# Patient Record
Sex: FEMALE
[De-identification: names, ages, dates, MRNs, and addresses within clinical notes are randomized per-mention and may not be internally consistent; named-entity substitution may affect disease eponyms.]

---

## 2020-09-09 ENCOUNTER — APPOINTMENT (OUTPATIENT)
Dept: RHEUMATOLOGY | Facility: CLINIC | Age: 60
End: 2020-09-09
Payer: MEDICARE

## 2020-09-09 VITALS
SYSTOLIC BLOOD PRESSURE: 133 MMHG | HEART RATE: 79 BPM | HEIGHT: 63 IN | BODY MASS INDEX: 28 KG/M2 | DIASTOLIC BLOOD PRESSURE: 86 MMHG | WEIGHT: 158 LBS | TEMPERATURE: 96.6 F | OXYGEN SATURATION: 96 %

## 2020-09-09 DIAGNOSIS — Z79.52 LONG TERM (CURRENT) USE OF SYSTEMIC STEROIDS: ICD-10-CM

## 2020-09-09 PROCEDURE — 99205 OFFICE O/P NEW HI 60 MIN: CPT

## 2020-09-09 RX ORDER — ATORVASTATIN CALCIUM 20 MG/1
20 TABLET, FILM COATED ORAL
Refills: 0 | Status: ACTIVE | COMMUNITY

## 2020-09-09 RX ORDER — LEVOTHYROXINE SODIUM 75 UG/1
75 TABLET ORAL
Refills: 0 | Status: ACTIVE | COMMUNITY

## 2020-09-09 RX ORDER — AMLODIPINE BESYLATE 5 MG/1
5 TABLET ORAL
Refills: 0 | Status: ACTIVE | COMMUNITY

## 2020-09-09 RX ORDER — OMEPRAZOLE 20 MG/1
20 CAPSULE, DELAYED RELEASE ORAL
Refills: 0 | Status: ACTIVE | COMMUNITY

## 2020-09-10 PROBLEM — Z79.52 CURRENT CHRONIC USE OF SYSTEMIC STEROIDS: Status: ACTIVE | Noted: 2020-09-10

## 2020-09-10 NOTE — ASSESSMENT
[FreeTextEntry1] : 59-year-old Tori and female with a history of hypothyroidism, hypertension and hypercholesterolemia presents as an urgent new patient today. She presents at the request of ophthalmology. She has a recent diagnosis of possible temporal arteritis.\par \par She states that she was in her usual state of health until several months ago when she started to deal with worsening joint pains but for the last 2 weeks she has been dealing with right-sided temporal pain as well as scalp sensitivity. She went to the emergency room at [facility] about a week ago, I reviewed these records, her sedimentation rate was 63, she is now status post bilateral temporal artery biopsy which has been read as negative.\par \par On my exam today she has intact temporal artery pulses, she has full range of motion of her joints without any evidence of inflammatory arthritis.\par \par Clinically she is better.\par \par Despite having a negative temporal artery biopsy certainly temporal arthritis is still in the differential diagnosis. Although it is unusual to see this condition in [place] it is certainly possible. Clinically she did have scalp sensitivity as well as right-sided temporal pain which could be consistent with GCA.\par \par Plan-\par -She has lowered her prednisone to 20 b.i.d.\par -Continue at this dose and repeat inflammatory markers\par -For now depending on how she does clinically I will try a rapid steroid taper\par -If her symptoms return will consider use of actemra as steroid sparing medication\par -Stressed importance of not discontinuing the medication on her own\par Risks and benefits of steroids were d/w patient including but not limited to weight gain, diabetes, HTN, avascular necrosis, osteoporosis, glaucoma, cataract, infections and immunosuppression.\par If symptoms return or become worse or she develops visual symptoms she is aware to call or go to the emergency room\par -followup 2-3 weeks

## 2020-09-10 NOTE — PHYSICAL EXAM
[General Appearance - Alert] : alert [General Appearance - Well Nourished] : well nourished [Sclera] : the sclera and conjunctiva were normal [General Appearance - Well Developed] : well developed [Oropharynx] : the oropharynx was normal [Neck Appearance] : the appearance of the neck was normal [Respiration, Rhythm And Depth] : normal respiratory rhythm and effort [Auscultation Breath Sounds / Voice Sounds] : lungs were clear to auscultation bilaterally [Heart Sounds] : normal S1 and S2 [Full Pulse] : the pedal pulses are present [Edema] : there was no peripheral edema [Abdomen Tenderness] : non-tender [Supraclavicular Lymph Nodes Enlarged Bilaterally] : supraclavicular [No Spinal Tenderness] : no spinal tenderness [Cervical Lymph Nodes Enlarged Anterior Bilaterally] : anterior cervical [Nail Clubbing] : no clubbing  or cyanosis of the fingernails [Abnormal Walk] : normal gait [Motor Tone] : muscle strength and tone were normal [Musculoskeletal - Swelling] : no joint swelling seen [] : no rash [FreeTextEntry1] : FROM neck, shoulders, elbows, wrists, hands, hips, knees, ankles and feet, including the small joints of the hands and feet without any evidence of inflammatory arthritis tightness hamstrings [Motor Exam] : the motor exam was normal [Deep Tendon Reflexes (DTR)] : deep tendon reflexes were 2+ and symmetric [Affect] : the affect was normal [Impaired Insight] : insight and judgment were intact [Oriented To Time, Place, And Person] : oriented to person, place, and time

## 2020-09-10 NOTE — CONSULT LETTER
[Dear  ___] : Dear ~LORI, [Consult Letter:] : I had the pleasure of evaluating your patient, [unfilled]. [Please see my note below.] : Please see my note below. [Consult Closing:] : Thank you very much for allowing me to participate in the care of this patient.  If you have any questions, please do not hesitate to contact me. [Sincerely,] : Sincerely, [FreeTextEntry3] : Nico Marr D.O\par

## 2020-09-10 NOTE — HISTORY OF PRESENT ILLNESS
[FreeTextEntry1] : This is a Urgent new patient. She comes in at the request of ophthalmology. She comes in to evaluate for possible giant cell arteritis.\par \par She states that for the last several months she has been dealing with pain on the right side of her body especially the right hip and right shoulder. For the last couple weeks when she lies on her right side her right temple area hurts. About a week ago her symptoms became severe, she took her blood pressure which was very high, she went to [facility]. She went to [facility]. She was seen by neurology there, her sedimentation rate was 63. She had a temporal artery biopsy which was read as negative. She's currently using prednisone 60 mg daily. On her own she lowered it to 40 mg today. She went to see ophthalmology today, her eye exam was normal.\par \par She does state that for the last few weeks when ever she brushed it here she had scalp sensitivity. She denies visual symptoms. She did have some jaw pain. She admits to a dry cough on occasion. She denies night sweats. She denies prolonged morning stiffness the stiffness in her shoulders or hips.\par \par She has an average appetite and denies weight loss. She denies fevers or chills or night sweats.

## 2020-09-21 ENCOUNTER — APPOINTMENT (OUTPATIENT)
Dept: RHEUMATOLOGY | Facility: CLINIC | Age: 60
End: 2020-09-21
Payer: MEDICAID

## 2020-09-21 VITALS
WEIGHT: 158 LBS | HEIGHT: 63 IN | HEART RATE: 91 BPM | BODY MASS INDEX: 28 KG/M2 | SYSTOLIC BLOOD PRESSURE: 120 MMHG | DIASTOLIC BLOOD PRESSURE: 79 MMHG | OXYGEN SATURATION: 99 % | TEMPERATURE: 97.8 F

## 2020-09-21 DIAGNOSIS — M31.6 OTHER GIANT CELL ARTERITIS: ICD-10-CM

## 2020-09-21 DIAGNOSIS — R70.0 ELEVATED ERYTHROCYTE SEDIMENTATION RATE: ICD-10-CM

## 2020-09-21 PROCEDURE — 99213 OFFICE O/P EST LOW 20 MIN: CPT

## 2020-09-21 RX ORDER — ALPRAZOLAM 0.25 MG/1
0.25 TABLET ORAL
Qty: 20 | Refills: 0 | Status: ACTIVE | COMMUNITY
Start: 2020-09-21 | End: 1900-01-01

## 2020-09-21 RX ORDER — PREDNISONE 20 MG/1
20 TABLET ORAL TWICE DAILY
Qty: 60 | Refills: 1 | Status: DISCONTINUED | COMMUNITY
End: 2020-09-21

## 2020-09-21 NOTE — PHYSICAL EXAM
[General Appearance - Alert] : alert [General Appearance - Well Nourished] : well nourished [General Appearance - Well Developed] : well developed [Sclera] : the sclera and conjunctiva were normal [Oropharynx] : the oropharynx was normal [Neck Appearance] : the appearance of the neck was normal [Respiration, Rhythm And Depth] : normal respiratory rhythm and effort [Auscultation Breath Sounds / Voice Sounds] : lungs were clear to auscultation bilaterally [Heart Sounds] : normal S1 and S2 [Full Pulse] : the pedal pulses are present [Edema] : there was no peripheral edema [Abdomen Tenderness] : non-tender [Cervical Lymph Nodes Enlarged Anterior Bilaterally] : anterior cervical [Supraclavicular Lymph Nodes Enlarged Bilaterally] : supraclavicular [No Spinal Tenderness] : no spinal tenderness [Abnormal Walk] : normal gait [Nail Clubbing] : no clubbing  or cyanosis of the fingernails [Musculoskeletal - Swelling] : no joint swelling seen [Motor Tone] : muscle strength and tone were normal [FreeTextEntry1] : FROM neck, shoulders, elbows, wrists, hands, hips, knees, ankles and feet, including the small joints of the hands and feet without any evidence of inflammatory arthritis tightness hamstrings [] : no rash [Deep Tendon Reflexes (DTR)] : deep tendon reflexes were 2+ and symmetric [Motor Exam] : the motor exam was normal [Oriented To Time, Place, And Person] : oriented to person, place, and time [Impaired Insight] : insight and judgment were intact [Affect] : the affect was normal

## 2020-09-21 NOTE — HISTORY OF PRESENT ILLNESS
[FreeTextEntry1] : Followup visit\par I have spoken to her on several occasions since her initial visit at our office. She was admitted at [facility] again. I spoke with the hospitalist, she is now off the steroids. She was having adverse effects to the prednisone. She is also using Xanax p.r.n. which helps. Her son is with her today.\par \par She denies any headaches or visual symptoms. She denies jaw claudication. She denies prolonged morning stiffness the stiffness of pain in her shoulders or hips. She rates it symptoms at a 1/10.\par \par She had an extensive workup at the hospital, I reviewed these records his sedimentation rate was 20.

## 2020-09-21 NOTE — ASSESSMENT
[FreeTextEntry1] : 59-year-old Tori and female with a history of hypothyroidism, hypertension and hypercholesterolemia seen as an urgent new patient 0n 9/9. lamin. She presents at the request of ophthalmology. She has a recent diagnosis of possible temporal arteritis.\par \par She states that she was in her usual state of health until several months ago when she started to deal with worsening joint pains but for the last 2 weeks she has been dealing with right-sided temporal pain as well as scalp sensitivity. She went to the emergency room at [facility] about a week ago, I reviewed these records, her sedimentation rate was 63, she is now status post bilateral temporal artery biopsy which has been read as negative.\par \par On my exam today she has intact temporal artery pulses, she has full range of motion of her joints without any evidence of inflammatory arthritis.\par \par Clinically she is better.\par \par Despite having a negative temporal artery biopsy certainly temporal arthritis is still in the differential diagnosis. Although it is unusual to see this condition in [place] it is certainly possible. Clinically she did have scalp sensitivity as well as right-sided temporal pain which could be consistent with GCA.\par \par Plan-\par -She is now off steroids\par -She could not tolerate them\par -She was readmitted at [facility] and had an extensive workup, her sedimentation rate on 2 occasions now has been normal\par -We'll continue with clinical surveillance on steroids\par -She does have some underlying anxiety, I will renew the Xanax\par -to repeat the sedimentation rate in a month\par -Followup p.r.n. pending labs\par

## 2020-10-09 ENCOUNTER — APPOINTMENT (OUTPATIENT)
Dept: RHEUMATOLOGY | Facility: CLINIC | Age: 60
End: 2020-10-09

## 2022-12-22 ENCOUNTER — OFFICE (OUTPATIENT)
Dept: URBAN - METROPOLITAN AREA CLINIC 12 | Facility: CLINIC | Age: 62
Setting detail: OPHTHALMOLOGY
End: 2022-12-22
Payer: COMMERCIAL

## 2022-12-22 DIAGNOSIS — H25.13: ICD-10-CM

## 2022-12-22 DIAGNOSIS — H10.523: ICD-10-CM

## 2022-12-22 DIAGNOSIS — H04.123: ICD-10-CM

## 2022-12-22 DIAGNOSIS — H43.393: ICD-10-CM

## 2022-12-22 PROCEDURE — 92014 COMPRE OPH EXAM EST PT 1/>: CPT | Performed by: OPHTHALMOLOGY

## 2022-12-22 ASSESSMENT — REFRACTION_AUTOREFRACTION
OS_AXIS: 069
OD_SPHERE: +1.25
OD_AXIS: 106
OS_CYLINDER: -1.00
OD_CYLINDER: -0.50
OS_SPHERE: +2.25

## 2022-12-22 ASSESSMENT — VISUAL ACUITY
OD_BCVA: 20/30+3
OS_BCVA: 20/40

## 2022-12-22 ASSESSMENT — CONFRONTATIONAL VISUAL FIELD TEST (CVF)
OS_FINDINGS: FULL
OD_FINDINGS: FULL

## 2022-12-22 ASSESSMENT — SPHEQUIV_DERIVED
OS_SPHEQUIV: 1.75
OD_SPHEQUIV: 1
OD_SPHEQUIV: 1
OS_SPHEQUIV: 1.75

## 2022-12-22 ASSESSMENT — AXIALLENGTH_DERIVED
OD_AL: 23.9894
OS_AL: 23.6448
OS_AL: 23.6448
OD_AL: 23.9894

## 2022-12-22 ASSESSMENT — KERATOMETRY
OD_AXISANGLE_DEGREES: 047
OD_K2POWER_DIOPTERS: 41.50
OD_K1POWER_DIOPTERS: 41.25
OS_K1POWER_DIOPTERS: 41.25
OS_AXISANGLE_DEGREES: 114
OS_K2POWER_DIOPTERS: 41.75

## 2022-12-22 ASSESSMENT — REFRACTION_CURRENTRX
OD_ADD: +2.25
OS_ADD: +2.25
OS_VPRISM_DIRECTION: PROGS
OS_CYLINDER: -0.25
OS_OVR_VA: 20/
OD_SPHERE: +1.25
OD_AXIS: 0
OS_AXIS: 078
OS_SPHERE: +1.50
OD_CYLINDER: 0.00
OD_OVR_VA: 20/
OD_VPRISM_DIRECTION: PROGS

## 2022-12-22 ASSESSMENT — REFRACTION_MANIFEST
OD_AXIS: 105
OS_AXIS: 070
OS_VA1: 20/40
OD_CYLINDER: -0.50
OS_CYLINDER: -1.00
OD_SPHERE: +1.25
OS_SPHERE: +2.25
OD_VA1: 20/20

## 2022-12-22 ASSESSMENT — TONOMETRY
OS_IOP_MMHG: 16
OD_IOP_MMHG: 15

## 2022-12-22 ASSESSMENT — SUPERFICIAL PUNCTATE KERATITIS (SPK)
OS_SPK: T
OD_SPK: T

## 2023-03-23 ENCOUNTER — OFFICE (OUTPATIENT)
Dept: URBAN - METROPOLITAN AREA CLINIC 12 | Facility: CLINIC | Age: 63
Setting detail: OPHTHALMOLOGY
End: 2023-03-23
Payer: COMMERCIAL

## 2023-03-23 DIAGNOSIS — H43.393: ICD-10-CM

## 2023-03-23 DIAGNOSIS — H25.13: ICD-10-CM

## 2023-03-23 DIAGNOSIS — H04.123: ICD-10-CM

## 2023-03-23 PROCEDURE — 99214 OFFICE O/P EST MOD 30 MIN: CPT | Performed by: OPHTHALMOLOGY

## 2023-03-23 ASSESSMENT — REFRACTION_CURRENTRX
OD_SPHERE: +1.25
OS_OVR_VA: 20/
OD_CYLINDER: 0.00
OS_SPHERE: +1.50
OD_OVR_VA: 20/
OS_CYLINDER: -0.25
OS_AXIS: 078
OS_VPRISM_DIRECTION: PROGS
OS_ADD: +2.25
OD_VPRISM_DIRECTION: PROGS
OD_ADD: +2.25
OD_AXIS: 0

## 2023-03-23 ASSESSMENT — REFRACTION_MANIFEST
OS_SPHERE: +2.25
OS_AXIS: 070
OS_CYLINDER: -1.00
OD_SPHERE: +1.25
OD_CYLINDER: -0.50
OD_AXIS: 105
OD_VA1: 20/20
OS_VA1: 20/40

## 2023-03-23 ASSESSMENT — CONFRONTATIONAL VISUAL FIELD TEST (CVF)
OD_FINDINGS: FULL
OS_FINDINGS: FULL

## 2023-03-23 ASSESSMENT — SPHEQUIV_DERIVED
OS_SPHEQUIV: 1.75
OD_SPHEQUIV: 1
OD_SPHEQUIV: 0.875
OS_SPHEQUIV: 1.375

## 2023-03-23 ASSESSMENT — AXIALLENGTH_DERIVED
OD_AL: 24.1356
OS_AL: 23.7925
OS_AL: 23.6448
OD_AL: 24.0847

## 2023-03-23 ASSESSMENT — REFRACTION_AUTOREFRACTION
OD_CYLINDER: -0.75
OS_SPHERE: +1.75
OD_AXIS: 085
OD_SPHERE: +1.25
OS_AXIS: 089
OS_CYLINDER: -0.75

## 2023-03-23 ASSESSMENT — VISUAL ACUITY
OS_BCVA: 20/40-1
OD_BCVA: 20/30

## 2023-03-23 ASSESSMENT — KERATOMETRY
OS_K1POWER_DIOPTERS: 41.50
OD_AXISANGLE_DEGREES: 051
OS_K2POWER_DIOPTERS: 41.50
OD_K2POWER_DIOPTERS: 41.25
OS_AXISANGLE_DEGREES: 090
OD_K1POWER_DIOPTERS: 41.00

## 2023-03-23 ASSESSMENT — SUPERFICIAL PUNCTATE KERATITIS (SPK)
OS_SPK: T
OD_SPK: T

## 2023-03-23 ASSESSMENT — TONOMETRY
OS_IOP_MMHG: 14
OD_IOP_MMHG: 16

## 2024-02-14 ENCOUNTER — OFFICE (OUTPATIENT)
Dept: URBAN - METROPOLITAN AREA CLINIC 12 | Facility: CLINIC | Age: 64
Setting detail: OPHTHALMOLOGY
End: 2024-02-14
Payer: COMMERCIAL

## 2024-02-14 DIAGNOSIS — H16.223: ICD-10-CM

## 2024-02-14 DIAGNOSIS — L30.8: ICD-10-CM

## 2024-02-14 PROBLEM — H25.13 CATARACT SENILE NUCLEAR SCLEROSIS; BOTH EYES: Status: ACTIVE | Noted: 2024-02-14

## 2024-02-14 PROCEDURE — 99213 OFFICE O/P EST LOW 20 MIN: CPT | Performed by: STUDENT IN AN ORGANIZED HEALTH CARE EDUCATION/TRAINING PROGRAM

## 2024-02-14 ASSESSMENT — REFRACTION_MANIFEST
OD_VA1: 20/20
OS_CYLINDER: -1.00
OS_VA1: 20/40
OD_AXIS: 105
OS_SPHERE: +2.25
OD_CYLINDER: -0.50
OS_AXIS: 070
OD_SPHERE: +1.25

## 2024-02-14 ASSESSMENT — SPHEQUIV_DERIVED
OD_SPHEQUIV: 0.75
OS_SPHEQUIV: 1.75
OS_SPHEQUIV: 1.125
OD_SPHEQUIV: 1

## 2024-02-14 ASSESSMENT — REFRACTION_AUTOREFRACTION
OD_AXIS: 097
OS_CYLINDER: -0.25
OS_SPHERE: +1.25
OD_SPHERE: +1.00
OS_AXIS: 159
OD_CYLINDER: -0.50

## 2024-02-14 ASSESSMENT — REFRACTION_CURRENTRX
OS_ADD: +2.25
OS_CYLINDER: -0.25
OS_OVR_VA: 20/
OD_CYLINDER: 0.00
OS_SPHERE: +1.50
OD_VPRISM_DIRECTION: PROGS
OD_SPHERE: +1.25
OD_ADD: +2.25
OS_VPRISM_DIRECTION: PROGS
OS_AXIS: 078
OD_AXIS: 0
OD_OVR_VA: 20/

## 2024-02-14 ASSESSMENT — CONFRONTATIONAL VISUAL FIELD TEST (CVF)
OD_FINDINGS: FULL
OS_FINDINGS: FULL

## 2024-02-14 ASSESSMENT — SUPERFICIAL PUNCTATE KERATITIS (SPK)
OD_SPK: 2+ 3+
OS_SPK: 2+ 3+

## 2025-01-24 ENCOUNTER — OFFICE (OUTPATIENT)
Dept: URBAN - METROPOLITAN AREA CLINIC 12 | Facility: CLINIC | Age: 65
Setting detail: OPHTHALMOLOGY
End: 2025-01-24
Payer: COMMERCIAL

## 2025-01-24 DIAGNOSIS — H25.13: ICD-10-CM

## 2025-01-24 DIAGNOSIS — H16.223: ICD-10-CM

## 2025-01-24 DIAGNOSIS — H43.393: ICD-10-CM

## 2025-01-24 PROBLEM — H25.12 CATARACT; RIGHT EYE, LEFT EYE, BOTH EYES: Status: ACTIVE | Noted: 2025-01-24

## 2025-01-24 PROBLEM — H25.11 CATARACT; RIGHT EYE, LEFT EYE, BOTH EYES: Status: ACTIVE | Noted: 2025-01-24

## 2025-01-24 PROCEDURE — 99214 OFFICE O/P EST MOD 30 MIN: CPT | Performed by: OPHTHALMOLOGY

## 2025-01-24 ASSESSMENT — KERATOMETRY
OS_AXISANGLE_DEGREES: 143
OD_K2POWER_DIOPTERS: 41.25
OD_AXISANGLE_DEGREES: 043
OS_K1POWER_DIOPTERS: 41.00
OD_K1POWER_DIOPTERS: 40.75
OS_K2POWER_DIOPTERS: 41.50

## 2025-01-24 ASSESSMENT — VISUAL ACUITY
OD_BCVA: 20/50-2
OS_BCVA: 20/80

## 2025-01-24 ASSESSMENT — SUPERFICIAL PUNCTATE KERATITIS (SPK)
OS_SPK: 2+ 3+
OD_SPK: 2+ 3+

## 2025-01-24 ASSESSMENT — REFRACTION_CURRENTRX
OD_OVR_VA: 20/
OS_VPRISM_DIRECTION: PROGS
OD_CYLINDER: SPH
OD_SPHERE: +1.25
OS_AXIS: 000
OS_ADD: +2.25
OS_SPHERE: +1.25
OD_VPRISM_DIRECTION: PROGS
OS_OVR_VA: 20/
OD_ADD: +2.25
OD_AXIS: 0
OS_CYLINDER: SPH

## 2025-01-24 ASSESSMENT — REFRACTION_AUTOREFRACTION
OS_SPHERE: +2.50
OD_AXIS: 089
OD_CYLINDER: -1.25
OS_AXIS: 092
OD_SPHERE: +1.00
OS_CYLINDER: -1.75

## 2025-01-24 ASSESSMENT — REFRACTION_MANIFEST
OD_SPHERE: +1.00
OS_CYLINDER: -1.75
OS_VA1: 20/NI
OS_AXIS: 090
OS_SPHERE: +2.50
OD_AXIS: 090
OD_VA1: 20/50
OD_CYLINDER: -1.25

## 2025-01-24 ASSESSMENT — TONOMETRY
OD_IOP_MMHG: 12
OS_IOP_MMHG: 13

## 2025-01-24 ASSESSMENT — CONFRONTATIONAL VISUAL FIELD TEST (CVF)
OS_FINDINGS: FULL
OD_FINDINGS: FULL

## 2025-02-11 ENCOUNTER — OFFICE (OUTPATIENT)
Dept: URBAN - METROPOLITAN AREA CLINIC 12 | Facility: CLINIC | Age: 65
Setting detail: OPHTHALMOLOGY
End: 2025-02-11
Payer: COMMERCIAL

## 2025-02-11 DIAGNOSIS — H25.11: ICD-10-CM

## 2025-02-11 DIAGNOSIS — H25.13: ICD-10-CM

## 2025-02-11 PROCEDURE — 99213 OFFICE O/P EST LOW 20 MIN: CPT | Performed by: OPHTHALMOLOGY

## 2025-02-11 PROCEDURE — 92136 OPHTHALMIC BIOMETRY: CPT | Mod: TC | Performed by: OPHTHALMOLOGY

## 2025-02-11 PROCEDURE — 92136 OPHTHALMIC BIOMETRY: CPT | Mod: RT | Performed by: OPHTHALMOLOGY

## 2025-02-11 ASSESSMENT — REFRACTION_AUTOREFRACTION
OS_AXIS: 092
OD_SPHERE: +1.00
OD_CYLINDER: -1.25
OS_SPHERE: +2.50
OD_AXIS: 089
OS_CYLINDER: -1.75

## 2025-02-11 ASSESSMENT — REFRACTION_MANIFEST
OS_VA1: 20/NI
OS_AXIS: 090
OD_AXIS: 090
OD_CYLINDER: -1.25
OD_VA1: 20/50
OS_SPHERE: +2.50
OS_CYLINDER: -1.75
OD_SPHERE: +1.00

## 2025-02-11 ASSESSMENT — KERATOMETRY
OS_AXISANGLE_DEGREES: 143
OD_AXISANGLE_DEGREES: 043
OS_K1POWER_DIOPTERS: 41.00
OD_K1POWER_DIOPTERS: 40.75
OS_K2POWER_DIOPTERS: 41.50
OD_K2POWER_DIOPTERS: 41.25

## 2025-02-11 ASSESSMENT — REFRACTION_CURRENTRX
OD_VPRISM_DIRECTION: PROGS
OS_AXIS: 000
OS_OVR_VA: 20/
OS_ADD: +2.25
OD_CYLINDER: SPH
OD_ADD: +2.25
OS_SPHERE: +1.25
OD_SPHERE: +1.25
OD_AXIS: 0
OD_OVR_VA: 20/
OS_VPRISM_DIRECTION: PROGS
OS_CYLINDER: SPH

## 2025-02-11 ASSESSMENT — TONOMETRY
OD_IOP_MMHG: 13
OS_IOP_MMHG: 12

## 2025-02-11 ASSESSMENT — CONFRONTATIONAL VISUAL FIELD TEST (CVF)
OD_FINDINGS: FULL
OS_FINDINGS: FULL

## 2025-02-11 ASSESSMENT — VISUAL ACUITY
OD_BCVA: 20/40
OS_BCVA: 20/70

## 2025-02-11 ASSESSMENT — SUPERFICIAL PUNCTATE KERATITIS (SPK)
OS_SPK: 1+ 2+
OD_SPK: 1+ 2+

## 2025-02-24 ENCOUNTER — ASC (OUTPATIENT)
Dept: URBAN - METROPOLITAN AREA SURGERY 8 | Facility: SURGERY | Age: 65
Setting detail: OPHTHALMOLOGY
End: 2025-02-24
Payer: COMMERCIAL

## 2025-02-24 DIAGNOSIS — H52.221: ICD-10-CM

## 2025-02-24 DIAGNOSIS — H25.11: ICD-10-CM

## 2025-02-24 PROCEDURE — 66984 XCAPSL CTRC RMVL W/O ECP: CPT | Mod: RT | Performed by: OPHTHALMOLOGY

## 2025-02-24 PROCEDURE — FEMTO PRECISION LASER CATARACT SURGERY: Mod: GY | Performed by: OPHTHALMOLOGY

## 2025-02-25 ENCOUNTER — OFFICE (OUTPATIENT)
Dept: URBAN - METROPOLITAN AREA CLINIC 12 | Facility: CLINIC | Age: 65
Setting detail: OPHTHALMOLOGY
End: 2025-02-25
Payer: COMMERCIAL

## 2025-02-25 ENCOUNTER — RX ONLY (RX ONLY)
Age: 65
End: 2025-02-25

## 2025-02-25 DIAGNOSIS — Z96.1: ICD-10-CM

## 2025-02-25 PROCEDURE — 99024 POSTOP FOLLOW-UP VISIT: CPT | Performed by: OPTOMETRIST

## 2025-02-25 ASSESSMENT — REFRACTION_CURRENTRX
OD_ADD: +2.25
OS_OVR_VA: 20/
OD_CYLINDER: SPH
OS_AXIS: 000
OD_SPHERE: +1.25
OD_OVR_VA: 20/
OS_ADD: +2.25
OS_VPRISM_DIRECTION: PROGS
OD_VPRISM_DIRECTION: PROGS
OS_SPHERE: +1.25
OS_CYLINDER: SPH
OD_AXIS: 0

## 2025-02-25 ASSESSMENT — TONOMETRY
OD_IOP_MMHG: 14
OS_IOP_MMHG: 15

## 2025-02-25 ASSESSMENT — REFRACTION_AUTOREFRACTION
OS_CYLINDER: -1.00
OS_SPHERE: +1.75
OS_AXIS: 110
OD_AXIS: 002
OD_SPHERE: +0.25
OD_CYLINDER: -0.25

## 2025-02-25 ASSESSMENT — REFRACTION_MANIFEST
OS_CYLINDER: -1.75
OD_VA1: 20/50
OD_CYLINDER: -1.25
OS_SPHERE: +2.50
OS_AXIS: 090
OD_SPHERE: +1.00
OS_VA1: 20/NI
OD_AXIS: 090

## 2025-02-25 ASSESSMENT — VISUAL ACUITY
OD_BCVA: 20/50
OS_BCVA: 20/20

## 2025-02-25 ASSESSMENT — KERATOMETRY
OS_AXISANGLE_DEGREES: 125
OD_K2POWER_DIOPTERS: 41.25
OS_K1POWER_DIOPTERS: 41.25
OS_K2POWER_DIOPTERS: 41.50
OD_K1POWER_DIOPTERS: 40.50
OD_AXISANGLE_DEGREES: 097

## 2025-02-25 ASSESSMENT — CONFRONTATIONAL VISUAL FIELD TEST (CVF)
OS_FINDINGS: FULL
OD_FINDINGS: FULL

## 2025-02-25 ASSESSMENT — SUPERFICIAL PUNCTATE KERATITIS (SPK)
OD_SPK: 1+ 2+
OS_SPK: 1+ 2+

## 2025-03-04 ENCOUNTER — OFFICE (OUTPATIENT)
Dept: URBAN - METROPOLITAN AREA CLINIC 12 | Facility: CLINIC | Age: 65
Setting detail: OPHTHALMOLOGY
End: 2025-03-04
Payer: COMMERCIAL

## 2025-03-04 DIAGNOSIS — H25.12: ICD-10-CM

## 2025-03-04 PROCEDURE — 92136 OPHTHALMIC BIOMETRY: CPT | Mod: LT | Performed by: OPHTHALMOLOGY

## 2025-03-04 ASSESSMENT — REFRACTION_CURRENTRX
OS_AXIS: 000
OD_VPRISM_DIRECTION: PROGS
OD_AXIS: 0
OD_SPHERE: +1.25
OD_ADD: +2.25
OS_CYLINDER: SPH
OS_SPHERE: +1.25
OD_OVR_VA: 20/
OS_VPRISM_DIRECTION: PROGS
OS_OVR_VA: 20/
OS_ADD: +2.25
OD_CYLINDER: SPH

## 2025-03-04 ASSESSMENT — REFRACTION_MANIFEST
OD_AXIS: 090
OD_SPHERE: +1.00
OD_CYLINDER: -1.25
OS_SPHERE: +2.50
OS_CYLINDER: -1.75
OS_AXIS: 090
OS_VA1: 20/NI
OD_VA1: 20/50

## 2025-03-04 ASSESSMENT — CONFRONTATIONAL VISUAL FIELD TEST (CVF)
OD_FINDINGS: FULL
OS_FINDINGS: FULL

## 2025-03-04 ASSESSMENT — REFRACTION_AUTOREFRACTION
OS_SPHERE: +2.00
OD_AXIS: 096
OS_AXIS: 100
OD_CYLINDER: -0.50
OS_CYLINDER: -1.50
OD_SPHERE: +0.25

## 2025-03-04 ASSESSMENT — TONOMETRY
OD_IOP_MMHG: 15
OS_IOP_MMHG: 12

## 2025-03-04 ASSESSMENT — SUPERFICIAL PUNCTATE KERATITIS (SPK)
OD_SPK: 1+ 2+
OS_SPK: 1+ 2+

## 2025-03-04 ASSESSMENT — KERATOMETRY
OD_AXISANGLE_DEGREES: 090
OD_K2POWER_DIOPTERS: 41.00
OS_AXISANGLE_DEGREES: 124
OD_K1POWER_DIOPTERS: 41.00
OS_K2POWER_DIOPTERS: 41.50
OS_K1POWER_DIOPTERS: 41.00

## 2025-03-04 ASSESSMENT — VISUAL ACUITY
OS_BCVA: 20/20-1
OD_BCVA: 20/60

## 2025-03-24 ENCOUNTER — ASC (OUTPATIENT)
Dept: URBAN - METROPOLITAN AREA SURGERY 8 | Facility: SURGERY | Age: 65
Setting detail: OPHTHALMOLOGY
End: 2025-03-24
Payer: COMMERCIAL

## 2025-03-24 DIAGNOSIS — H25.12: ICD-10-CM

## 2025-03-24 DIAGNOSIS — H52.222: ICD-10-CM

## 2025-03-24 PROCEDURE — 66984 XCAPSL CTRC RMVL W/O ECP: CPT | Mod: 79,LT | Performed by: OPHTHALMOLOGY

## 2025-03-24 PROCEDURE — FEMTO PRECISION LASER CATARACT SURGERY: Mod: GY | Performed by: OPHTHALMOLOGY

## 2025-03-25 ENCOUNTER — OFFICE (OUTPATIENT)
Dept: URBAN - METROPOLITAN AREA CLINIC 12 | Facility: CLINIC | Age: 65
Setting detail: OPHTHALMOLOGY
End: 2025-03-25
Payer: COMMERCIAL

## 2025-03-25 DIAGNOSIS — Z96.1: ICD-10-CM

## 2025-03-25 PROCEDURE — 99024 POSTOP FOLLOW-UP VISIT: CPT | Performed by: OPTOMETRIST

## 2025-03-25 ASSESSMENT — KERATOMETRY
OD_AXISANGLE_DEGREES: 81
OD_K1POWER_DIOPTERS: 41.00
OS_K1POWER_DIOPTERS: 40.75
OD_K2POWER_DIOPTERS: 41.50
OS_AXISANGLE_DEGREES: 82
OS_K2POWER_DIOPTERS: 41.25

## 2025-03-25 ASSESSMENT — REFRACTION_AUTOREFRACTION
OS_CYLINDER: -0.25
OD_SPHERE: -0.25
OD_AXIS: 0
OS_AXIS: 168
OS_SPHERE: +0.25
OD_CYLINDER: SPHERE

## 2025-03-25 ASSESSMENT — REFRACTION_MANIFEST
OS_SPHERE: +2.50
OS_CYLINDER: -1.75
OD_SPHERE: +1.00
OS_VA1: 20/NI
OD_CYLINDER: -1.25
OD_VA1: 20/50
OD_AXIS: 090
OS_AXIS: 090

## 2025-03-25 ASSESSMENT — SUPERFICIAL PUNCTATE KERATITIS (SPK)
OS_SPK: 1+ 2+
OD_SPK: 1+ 2+

## 2025-03-25 ASSESSMENT — REFRACTION_CURRENTRX
OD_SPHERE: +1.25
OD_AXIS: 0
OS_OVR_VA: 20/
OS_VPRISM_DIRECTION: PROGS
OD_OVR_VA: 20/
OD_ADD: +2.25
OS_SPHERE: +1.25
OS_CYLINDER: SPH
OD_CYLINDER: SPH
OS_AXIS: 000
OD_VPRISM_DIRECTION: PROGS
OS_ADD: +2.25

## 2025-03-25 ASSESSMENT — VISUAL ACUITY
OD_BCVA: 20/20
OS_BCVA: 20/20-1

## 2025-03-25 ASSESSMENT — CONFRONTATIONAL VISUAL FIELD TEST (CVF)
OD_FINDINGS: FULL
OS_FINDINGS: FULL

## 2025-03-25 ASSESSMENT — TONOMETRY: OD_IOP_MMHG: 13

## 2025-04-01 ENCOUNTER — OFFICE (OUTPATIENT)
Dept: URBAN - METROPOLITAN AREA CLINIC 12 | Facility: CLINIC | Age: 65
Setting detail: OPHTHALMOLOGY
End: 2025-04-01
Payer: COMMERCIAL

## 2025-04-01 DIAGNOSIS — Z96.1: ICD-10-CM

## 2025-04-01 PROCEDURE — 99024 POSTOP FOLLOW-UP VISIT: CPT | Performed by: OPTOMETRIST

## 2025-04-01 ASSESSMENT — SUPERFICIAL PUNCTATE KERATITIS (SPK)
OS_SPK: 1+ 2+
OD_SPK: 1+ 2+

## 2025-04-01 ASSESSMENT — REFRACTION_CURRENTRX
OS_ADD: +2.25
OD_VPRISM_DIRECTION: PROGS
OS_SPHERE: +1.25
OS_CYLINDER: SPH
OD_CYLINDER: SPH
OD_SPHERE: +1.25
OS_VPRISM_DIRECTION: PROGS
OD_ADD: +2.25
OS_AXIS: 000
OD_AXIS: 0
OD_OVR_VA: 20/
OS_OVR_VA: 20/

## 2025-04-01 ASSESSMENT — REFRACTION_MANIFEST
OD_SPHERE: +1.00
OD_CYLINDER: -1.25
OD_VA1: 20/50
OS_AXIS: 090
OD_AXIS: 090
OS_VA1: 20/NI
OS_CYLINDER: -1.75
OS_SPHERE: +2.50

## 2025-04-01 ASSESSMENT — REFRACTION_AUTOREFRACTION
OD_SPHERE: PLANO
OD_CYLINDER: -0.25
OD_AXIS: 090
OS_AXIS: 046
OS_CYLINDER: -0.25
OS_SPHERE: PLANO

## 2025-04-01 ASSESSMENT — KERATOMETRY
OD_AXISANGLE_DEGREES: 089
OS_AXISANGLE_DEGREES: 090
OS_K2POWER_DIOPTERS: 4.50
OD_K2POWER_DIOPTERS: 41.25
OS_K1POWER_DIOPTERS: 41.50
OD_K1POWER_DIOPTERS: 41.00

## 2025-04-01 ASSESSMENT — VISUAL ACUITY
OD_BCVA: 20/20
OS_BCVA: 20/20-1

## 2025-04-01 ASSESSMENT — TONOMETRY
OD_IOP_MMHG: 10
OS_IOP_MMHG: 14

## 2025-04-01 ASSESSMENT — CONFRONTATIONAL VISUAL FIELD TEST (CVF)
OD_FINDINGS: FULL
OS_FINDINGS: FULL

## 2025-04-15 ENCOUNTER — OFFICE (OUTPATIENT)
Dept: URBAN - METROPOLITAN AREA CLINIC 12 | Facility: CLINIC | Age: 65
Setting detail: OPHTHALMOLOGY
End: 2025-04-15
Payer: COMMERCIAL

## 2025-04-15 ENCOUNTER — RX ONLY (RX ONLY)
Age: 65
End: 2025-04-15

## 2025-04-15 DIAGNOSIS — Z96.1: ICD-10-CM

## 2025-04-15 PROCEDURE — 99024 POSTOP FOLLOW-UP VISIT: CPT | Performed by: OPHTHALMOLOGY

## 2025-04-15 ASSESSMENT — REFRACTION_CURRENTRX
OD_CYLINDER: SPH
OS_VPRISM_DIRECTION: PROGS
OD_ADD: +2.25
OD_SPHERE: +1.25
OS_OVR_VA: 20/
OS_AXIS: 000
OD_OVR_VA: 20/
OD_AXIS: 0
OS_CYLINDER: SPH
OS_ADD: +2.25
OD_VPRISM_DIRECTION: PROGS
OS_SPHERE: +1.25

## 2025-04-15 ASSESSMENT — REFRACTION_AUTOREFRACTION
OS_SPHERE: -0.25
OS_AXIS: 061
OD_CYLINDER: -0.25
OD_AXIS: 094
OD_SPHERE: PLANO
OS_CYLINDER: -0.25

## 2025-04-15 ASSESSMENT — KERATOMETRY
OS_K2POWER_DIOPTERS: 41.50
OD_AXISANGLE_DEGREES: 090
OD_K2POWER_DIOPTERS: 41.25
OS_K1POWER_DIOPTERS: 41.25
OD_K1POWER_DIOPTERS: 41.25
OS_AXISANGLE_DEGREES: 174

## 2025-04-15 ASSESSMENT — REFRACTION_MANIFEST
OD_CYLINDER: -1.25
OD_AXIS: 090
OD_VA1: 20/50
OD_SPHERE: +1.00
OS_CYLINDER: -1.75
OS_VA1: 20/NI
OS_SPHERE: +2.50
OS_AXIS: 090

## 2025-04-15 ASSESSMENT — VISUAL ACUITY
OS_BCVA: 20/25+2
OD_BCVA: 20/25+2

## 2025-04-15 ASSESSMENT — CONFRONTATIONAL VISUAL FIELD TEST (CVF)
OS_FINDINGS: FULL
OD_FINDINGS: FULL

## 2025-04-15 ASSESSMENT — TONOMETRY
OD_IOP_MMHG: 10
OS_IOP_MMHG: 16

## 2025-06-05 ENCOUNTER — OFFICE (OUTPATIENT)
Dept: URBAN - METROPOLITAN AREA CLINIC 12 | Facility: CLINIC | Age: 65
Setting detail: OPHTHALMOLOGY
End: 2025-06-05
Payer: COMMERCIAL

## 2025-06-05 DIAGNOSIS — Z96.1: ICD-10-CM

## 2025-06-05 DIAGNOSIS — H16.223: ICD-10-CM

## 2025-06-05 PROCEDURE — 99024 POSTOP FOLLOW-UP VISIT: CPT | Performed by: OPHTHALMOLOGY

## 2025-06-05 ASSESSMENT — KERATOMETRY
OS_K1POWER_DIOPTERS: 41.50
OS_K2POWER_DIOPTERS: 41.50
OD_AXISANGLE_DEGREES: 069
OD_K2POWER_DIOPTERS: 41.25
OS_AXISANGLE_DEGREES: 090
OD_K1POWER_DIOPTERS: 41.00

## 2025-06-05 ASSESSMENT — REFRACTION_MANIFEST
OD_VA1: 20/50
OS_VA1: 20/NI
OD_CYLINDER: -1.25
OD_AXIS: 090
OD_SPHERE: +1.00
OS_CYLINDER: -1.75
OS_SPHERE: +2.50
OS_AXIS: 090

## 2025-06-05 ASSESSMENT — REFRACTION_AUTOREFRACTION
OD_AXIS: 107
OS_SPHERE: PLANO
OS_CYLINDER: -0.50
OD_SPHERE: +0.25
OD_CYLINDER: -0.50
OS_AXIS: 059

## 2025-06-05 ASSESSMENT — TONOMETRY
OS_IOP_MMHG: 15
OD_IOP_MMHG: 12

## 2025-06-05 ASSESSMENT — REFRACTION_CURRENTRX
OD_AXIS: 0
OD_CYLINDER: SPH
OS_CYLINDER: SPH
OD_OVR_VA: 20/
OS_SPHERE: +1.25
OS_VPRISM_DIRECTION: PROGS
OS_AXIS: 000
OS_ADD: +2.25
OD_SPHERE: +1.25
OD_ADD: +2.25
OD_VPRISM_DIRECTION: PROGS
OS_OVR_VA: 20/

## 2025-06-05 ASSESSMENT — CONFRONTATIONAL VISUAL FIELD TEST (CVF)
OD_FINDINGS: FULL
OS_FINDINGS: FULL

## 2025-06-05 ASSESSMENT — VISUAL ACUITY
OS_BCVA: 20/20-2
OD_BCVA: 20/20-2